# Patient Record
Sex: MALE | Race: BLACK OR AFRICAN AMERICAN | NOT HISPANIC OR LATINO | URBAN - METROPOLITAN AREA
[De-identification: names, ages, dates, MRNs, and addresses within clinical notes are randomized per-mention and may not be internally consistent; named-entity substitution may affect disease eponyms.]

---

## 2017-06-08 ENCOUNTER — OUTPATIENT (OUTPATIENT)
Dept: OUTPATIENT SERVICES | Facility: HOSPITAL | Age: 42
LOS: 1 days | End: 2017-06-08

## 2017-06-09 ENCOUNTER — OUTPATIENT (OUTPATIENT)
Dept: OUTPATIENT SERVICES | Facility: HOSPITAL | Age: 42
LOS: 1 days | End: 2017-06-09

## 2019-08-29 ENCOUNTER — NON-APPOINTMENT (OUTPATIENT)
Age: 44
End: 2019-08-29

## 2019-08-29 ENCOUNTER — APPOINTMENT (OUTPATIENT)
Dept: CARDIOLOGY | Facility: CLINIC | Age: 44
End: 2019-08-29
Payer: MEDICARE

## 2019-08-29 VITALS
SYSTOLIC BLOOD PRESSURE: 118 MMHG | DIASTOLIC BLOOD PRESSURE: 90 MMHG | WEIGHT: 290 LBS | HEART RATE: 85 BPM | HEIGHT: 78 IN | OXYGEN SATURATION: 99 % | BODY MASS INDEX: 33.55 KG/M2

## 2019-08-29 DIAGNOSIS — Z83.3 FAMILY HISTORY OF DIABETES MELLITUS: ICD-10-CM

## 2019-08-29 DIAGNOSIS — Z82.49 FAMILY HISTORY OF ISCHEMIC HEART DISEASE AND OTHER DISEASES OF THE CIRCULATORY SYSTEM: ICD-10-CM

## 2019-08-29 DIAGNOSIS — K21.9 GASTRO-ESOPHAGEAL REFLUX DISEASE W/OUT ESOPHAGITIS: ICD-10-CM

## 2019-08-29 DIAGNOSIS — K57.90 DIVERTICULOSIS OF INTESTINE, PART UNSPECIFIED, W/OUT PERFORATION OR ABSCESS W/OUT BLEEDING: ICD-10-CM

## 2019-08-29 DIAGNOSIS — Z78.9 OTHER SPECIFIED HEALTH STATUS: ICD-10-CM

## 2019-08-29 PROCEDURE — 99205 OFFICE O/P NEW HI 60 MIN: CPT

## 2019-08-29 PROCEDURE — 93000 ELECTROCARDIOGRAM COMPLETE: CPT

## 2019-08-29 NOTE — PHYSICAL EXAM
[Normal Appearance] : normal appearance [General Appearance - Well Developed] : well developed [Well Groomed] : well groomed [General Appearance - Well Nourished] : well nourished [No Deformities] : no deformities [Normal Conjunctiva] : the conjunctiva exhibited no abnormalities [General Appearance - In No Acute Distress] : no acute distress [Normal Oral Mucosa] : normal oral mucosa [Eyelids - No Xanthelasma] : the eyelids demonstrated no xanthelasmas [No Oral Pallor] : no oral pallor [No Oral Cyanosis] : no oral cyanosis [Normal Jugular Venous A Waves Present] : normal jugular venous A waves present [No Jugular Venous Hillman A Waves] : no jugular venous hillman A waves [Normal Jugular Venous V Waves Present] : normal jugular venous V waves present [Heart Sounds] : normal S1 and S2 [Heart Rate And Rhythm] : heart rate and rhythm were normal [Murmurs] : no murmurs present [Respiration, Rhythm And Depth] : normal respiratory rhythm and effort [Exaggerated Use Of Accessory Muscles For Inspiration] : no accessory muscle use [Auscultation Breath Sounds / Voice Sounds] : lungs were clear to auscultation bilaterally [Abdomen Soft] : soft [Abdomen Tenderness] : non-tender [Abdomen Mass (___ Cm)] : no abdominal mass palpated [Gait - Sufficient For Exercise Testing] : the gait was sufficient for exercise testing [Abnormal Walk] : normal gait [Nail Clubbing] : no clubbing of the fingernails [Petechial Hemorrhages (___cm)] : no petechial hemorrhages [Cyanosis, Localized] : no localized cyanosis [Skin Color & Pigmentation] : normal skin color and pigmentation [] : no rash [No Venous Stasis] : no venous stasis [Skin Lesions] : no skin lesions [No Skin Ulcers] : no skin ulcer [No Xanthoma] : no  xanthoma was observed [Oriented To Time, Place, And Person] : oriented to person, place, and time [Affect] : the affect was normal [Mood] : the mood was normal [No Anxiety] : not feeling anxious

## 2019-08-29 NOTE — REASON FOR VISIT
[Consultation] : a consultation regarding [Palpitations] : palpitations [FreeTextEntry1] : I saw this 43-year-old man in cardiac consultation on  08/29/19\par He has been on insulin diabetic for many years, had a bowel resection for diverticulitis, had a pulmonary embolus 5 years ago.\par He suffers from severe migraine headaches.\par  recently he was having a lot of extra beats when he was consuming large amounts of caffeine\par

## 2019-08-29 NOTE — HISTORY OF PRESENT ILLNESS
[FreeTextEntry1] : he has no chest pain\par He has no shortness of breath\par He has  palpitations\par He has no syncope\par He is neurologically intact\par He has no edema\par He has no GI symptoms\par

## 2019-08-29 NOTE — DISCUSSION/SUMMARY
[FreeTextEntry1] : 1) Echo with bubble study to exclude a PFO associated with migraine headaches\par 2) Exercise stress test to assess for coronary artery disease ( DM, family history)

## 2019-09-11 ENCOUNTER — OUTPATIENT (OUTPATIENT)
Dept: OUTPATIENT SERVICES | Facility: HOSPITAL | Age: 44
LOS: 1 days | End: 2019-09-11

## 2019-09-11 ENCOUNTER — APPOINTMENT (OUTPATIENT)
Dept: CARDIOLOGY | Facility: CLINIC | Age: 44
End: 2019-09-11
Payer: MEDICARE

## 2019-09-11 ENCOUNTER — TRANSCRIPTION ENCOUNTER (OUTPATIENT)
Age: 44
End: 2019-09-11

## 2019-09-11 PROCEDURE — 96374 THER/PROPH/DIAG INJ IV PUSH: CPT | Mod: 59

## 2019-09-11 PROCEDURE — 93308 TTE F-UP OR LMTD: CPT

## 2019-09-26 ENCOUNTER — APPOINTMENT (OUTPATIENT)
Dept: CARDIOLOGY | Facility: CLINIC | Age: 44
End: 2019-09-26

## 2019-10-21 ENCOUNTER — APPOINTMENT (OUTPATIENT)
Dept: CARDIOLOGY | Facility: CLINIC | Age: 44
End: 2019-10-21
Payer: MEDICARE

## 2019-10-21 PROCEDURE — 93015 CV STRESS TEST SUPVJ I&R: CPT

## 2019-10-31 ENCOUNTER — APPOINTMENT (OUTPATIENT)
Dept: CARDIOLOGY | Facility: CLINIC | Age: 44
End: 2019-10-31
Payer: MEDICARE

## 2019-10-31 VITALS
SYSTOLIC BLOOD PRESSURE: 118 MMHG | HEIGHT: 78 IN | HEART RATE: 95 BPM | RESPIRATION RATE: 18 BRPM | DIASTOLIC BLOOD PRESSURE: 82 MMHG | BODY MASS INDEX: 33.21 KG/M2 | WEIGHT: 287 LBS | OXYGEN SATURATION: 95 %

## 2019-10-31 DIAGNOSIS — Z00.00 ENCOUNTER FOR GENERAL ADULT MEDICAL EXAMINATION W/OUT ABNORMAL FINDINGS: ICD-10-CM

## 2019-10-31 DIAGNOSIS — R94.31 ABNORMAL ELECTROCARDIOGRAM [ECG] [EKG]: ICD-10-CM

## 2019-10-31 PROCEDURE — 99215 OFFICE O/P EST HI 40 MIN: CPT

## 2019-10-31 RX ORDER — METFORMIN HYDROCHLORIDE 500 MG/1
500 TABLET, COATED ORAL TWICE DAILY
Refills: 0 | Status: ACTIVE | COMMUNITY
Start: 2019-08-29

## 2019-10-31 RX ORDER — PROPRANOLOL HYDROCHLORIDE 60 MG/1
60 TABLET ORAL DAILY
Refills: 0 | Status: ACTIVE | COMMUNITY
Start: 2019-08-29

## 2019-10-31 RX ORDER — SEMAGLUTIDE 1.34 MG/ML
2 INJECTION, SOLUTION SUBCUTANEOUS WEEKLY
Refills: 0 | Status: ACTIVE | COMMUNITY
Start: 2019-08-29

## 2019-10-31 RX ORDER — DULOXETINE HYDROCHLORIDE 60 MG/1
60 CAPSULE, DELAYED RELEASE ORAL DAILY
Refills: 0 | Status: ACTIVE | COMMUNITY
Start: 2019-08-29

## 2019-10-31 RX ORDER — OMEPRAZOLE 40 MG/1
40 CAPSULE, DELAYED RELEASE ORAL DAILY
Refills: 0 | Status: ACTIVE | COMMUNITY
Start: 2019-08-29

## 2019-10-31 NOTE — PHYSICAL EXAM
[General Appearance - Well Developed] : well developed [Normal Appearance] : normal appearance [Well Groomed] : well groomed [General Appearance - Well Nourished] : well nourished [No Deformities] : no deformities [General Appearance - In No Acute Distress] : no acute distress [Normal Conjunctiva] : the conjunctiva exhibited no abnormalities [Eyelids - No Xanthelasma] : the eyelids demonstrated no xanthelasmas [Normal Oral Mucosa] : normal oral mucosa [No Oral Pallor] : no oral pallor [No Oral Cyanosis] : no oral cyanosis [Normal Jugular Venous A Waves Present] : normal jugular venous A waves present [Normal Jugular Venous V Waves Present] : normal jugular venous V waves present [No Jugular Venous Hillman A Waves] : no jugular venous hillman A waves [Respiration, Rhythm And Depth] : normal respiratory rhythm and effort [Exaggerated Use Of Accessory Muscles For Inspiration] : no accessory muscle use [Auscultation Breath Sounds / Voice Sounds] : lungs were clear to auscultation bilaterally [Heart Rate And Rhythm] : heart rate and rhythm were normal [Heart Sounds] : normal S1 and S2 [Murmurs] : no murmurs present [Abdomen Soft] : soft [Abdomen Tenderness] : non-tender [Abdomen Mass (___ Cm)] : no abdominal mass palpated [Abnormal Walk] : normal gait [Gait - Sufficient For Exercise Testing] : the gait was sufficient for exercise testing [Nail Clubbing] : no clubbing of the fingernails [Cyanosis, Localized] : no localized cyanosis [Petechial Hemorrhages (___cm)] : no petechial hemorrhages [Skin Color & Pigmentation] : normal skin color and pigmentation [] : no rash [No Venous Stasis] : no venous stasis [Skin Lesions] : no skin lesions [No Skin Ulcers] : no skin ulcer [No Xanthoma] : no  xanthoma was observed [Oriented To Time, Place, And Person] : oriented to person, place, and time [Affect] : the affect was normal [Mood] : the mood was normal [No Anxiety] : not feeling anxious

## 2019-10-31 NOTE — REASON FOR VISIT
[Follow-Up - Clinic] : a clinic follow-up of [Palpitations] : palpitations [FreeTextEntry1] : I saw this 44-year-old man in f/u cardiac consultation on  10/31/19\par He has been on insulin diabetic for many years, had a bowel resection for diverticulitis, had a pulmonary embolus 5 years ago.\par He suffers from severe migraine headaches.\par  recently he was having a lot of extra beats when he was consuming large amounts of caffeine\par

## 2019-10-31 NOTE — DISCUSSION/SUMMARY
[FreeTextEntry1] : 1) Echo with bubble study to exclude a PFO was negative. Normal study.\par 2) Exercise stress test to assess for coronary artery disease ( DM, family history) was normal.\par Suggest f/u in one year

## 2020-09-10 ENCOUNTER — OUTPATIENT (OUTPATIENT)
Dept: OUTPATIENT SERVICES | Facility: HOSPITAL | Age: 45
LOS: 1 days | End: 2020-09-10

## 2020-10-29 ENCOUNTER — APPOINTMENT (OUTPATIENT)
Dept: CARDIOLOGY | Facility: CLINIC | Age: 45
End: 2020-10-29

## 2021-08-05 ENCOUNTER — OUTPATIENT (OUTPATIENT)
Dept: OUTPATIENT SERVICES | Facility: HOSPITAL | Age: 46
LOS: 1 days | End: 2021-08-05

## 2023-12-05 ENCOUNTER — OFFICE (OUTPATIENT)
Dept: URBAN - METROPOLITAN AREA CLINIC 103 | Facility: CLINIC | Age: 48
Setting detail: OPHTHALMOLOGY
End: 2023-12-05
Payer: MEDICARE

## 2023-12-05 DIAGNOSIS — G43.109: ICD-10-CM

## 2023-12-05 DIAGNOSIS — E11.9: ICD-10-CM

## 2023-12-05 DIAGNOSIS — H35.033: ICD-10-CM

## 2023-12-05 PROCEDURE — 92004 COMPRE OPH EXAM NEW PT 1/>: CPT | Performed by: OPHTHALMOLOGY

## 2023-12-05 ASSESSMENT — CONFRONTATIONAL VISUAL FIELD TEST (CVF)
OS_FINDINGS: FULL
OD_FINDINGS: FULL

## 2023-12-05 ASSESSMENT — REFRACTION_CURRENTRX
OD_VPRISM_DIRECTION: SV
OS_OVR_VA: 20/
OS_VPRISM_DIRECTION: SV
OS_SPHERE: -0.25
OD_CYLINDER: -1.50
OD_SPHERE: PLANO
OD_AXIS: 092
OS_AXIS: 122
OD_OVR_VA: 20/
OS_CYLINDER: -0.50

## 2023-12-05 ASSESSMENT — REFRACTION_AUTOREFRACTION
OD_SPHERE: -0.25
OD_CYLINDER: -1.25
OS_AXIS: 124
OD_AXIS: 082
OS_SPHERE: PLANO
OS_CYLINDER: -1.25

## 2023-12-05 ASSESSMENT — SPHEQUIV_DERIVED: OD_SPHEQUIV: -0.875

## 2024-03-14 ENCOUNTER — APPOINTMENT (OUTPATIENT)
Dept: NEUROLOGY | Facility: CLINIC | Age: 49
End: 2024-03-14
Payer: MEDICARE

## 2024-03-14 VITALS
DIASTOLIC BLOOD PRESSURE: 83 MMHG | WEIGHT: 260 LBS | SYSTOLIC BLOOD PRESSURE: 132 MMHG | HEIGHT: 78 IN | OXYGEN SATURATION: 98 % | HEART RATE: 79 BPM | BODY MASS INDEX: 30.08 KG/M2

## 2024-03-14 DIAGNOSIS — G50.0 TRIGEMINAL NEURALGIA: ICD-10-CM

## 2024-03-14 DIAGNOSIS — R55 SYNCOPE AND COLLAPSE: ICD-10-CM

## 2024-03-14 PROCEDURE — G2211 COMPLEX E/M VISIT ADD ON: CPT

## 2024-03-14 PROCEDURE — 99205 OFFICE O/P NEW HI 60 MIN: CPT

## 2024-03-14 RX ORDER — INSULIN GLARGINE 100 [IU]/ML
100 INJECTION, SOLUTION SUBCUTANEOUS DAILY
Refills: 0 | Status: DISCONTINUED | COMMUNITY
Start: 2019-08-29 | End: 2024-03-14

## 2024-03-14 RX ORDER — ONABOTULINUMTOXINA 200 [USP'U]/1
200 INJECTION, POWDER, LYOPHILIZED, FOR SOLUTION INTRADERMAL; INTRAMUSCULAR
Refills: 0 | Status: DISCONTINUED | COMMUNITY
Start: 2019-08-29 | End: 2024-03-14

## 2024-03-14 RX ORDER — TOPIRAMATE 25 MG/1
25 TABLET, FILM COATED ORAL TWICE DAILY
Refills: 0 | Status: DISCONTINUED | COMMUNITY
Start: 2019-08-29 | End: 2024-03-14

## 2024-03-14 RX ORDER — UBIDECARENONE/VIT E ACET 100MG-5
1000 CAPSULE ORAL
Refills: 0 | Status: DISCONTINUED | COMMUNITY
End: 2024-03-14

## 2024-03-14 RX ORDER — LISDEXAMFETAMINE DIMESYLATE 30 MG/1
30 CAPSULE ORAL
Refills: 0 | Status: ACTIVE | COMMUNITY

## 2024-03-14 NOTE — HISTORY OF PRESENT ILLNESS
[FreeTextEntry1] : CC: chronic migraine and neuralgia     Patient Referred by: ophthalmology     HPI: Has a bruise on his back that radiates to the head, arms, . If he pushes on his anterior chest he will get discomfort. Also sometimes can't press on his stomach He was vomiting because of Ozempic. Notices when he gets up and down he gets nauseated and vomits. He has had trigeminal pain across the face on the left side. He has had shooting pain on the right side shooting across the top of the head and into the eye. He had botox and thinks that it caused his new problems. He see's Dr. carrillo for nerve blocks. He was getting steroid shots in the shoulder and neck with Chiropractor and Dr. Trujillo for epidural. He often uses heat on his back. When he does this it will sometimes cause bleeding. He went to dermatology which was indeterminate. He was also seen by rheumatology at [place] and another in [facility] which was negative.  He is due for the Aimovig shot next week. He can feel when it is wearing off. The facial pain he describes as "spidering"' across the face, usually staring at the jaw, the ear, tip of the nose or across the eye socket. Exacerbated by turning the head, cold air, brushing his teeth, wind, spend to much time in the car or on the computer. Sharp pain at times, there is a refractory period. On the right side the pain radiates from the back. The back pain radiates to the top of the arm, front of the chest, feels like a jolt, electrical, numbness. He has episodes of passing out. He thought initially was from getting up to quickly. Feels a "snapping" in the back of his head, thought it was from pain. Things will go swirly and then pass out. has about 3-4 times a week. Fell forward into the cabinet, tried to fight it and was shaking. Not clearly losing consciousness. He is able to get right back up in a few seconds later. hasn't had a workup for this yet. denies any SOB, palpitations or chest pain. His PCP wants him to see a cardiologist.      HAs began: since 2011 was first diagnosed, but had then since he was a child,  in the past 5 years he has a bruise on his back that he is unsure how it started.  Location: left sided, then through the right vertex Quality: can be like a dagger through the right side, throbbing pressure, "awareness of my brain" Severity: 8/10 , 5/10 Disability: MIDAS 130 severe Duration: constant Frequency: daily Temporal course: afternoons around 3pm Time of day: can wake up with headache Pain Free between Attacks: rarely days Triggers: scents, noise, aggravation, light,  Relieving factors: lemongrass roller, ice hats, quiet room, light blocking glasses, taking a nap at 2:45 Exacerbating factors: exercise and Valsalva maneuver do not make headache worse Prodrome or postdrome: has feeling like switch and can tell something is coming on "like a negative across the screen"   Aura: none  Ass'd Symptoms: Nausea + Vomiting + in last 1-2 yrs Photophobia + Phonophobia: + Osmophobia brain fog/difficulty concentrating + Irritability Allodynia - Blurred Vision - Neck pain + Dizziness +   Autonomic features: none   Additional Social/Work History: Occupation: on disability for the headaches, was a technician Habits: Caffeine:  coffee 1 -2 cups daily ETOH:   none     Tobacco:   none    Drugs: cannabis vape and edible,  daily- helpful for HA Exercise: trying to get some  Diet:    hydration: tries to keep hydrated Ophthalmologic: wear, regular eye checkups Sleep: descent unless in pain, can get 3-4 hrs. he does snore- does not have SISI Dental:   unknown Sinus/Allergies: none Head Trauma: fallen and hit his head 10-15 times, has had concussions Hypermobility: Psychiatric: anxiety and depression   Treatment present: Acute: Nurtec,  Preventive: aimovig 140mg monthly 4-5 years- works for the right sided pain. works for about 3 weeks, recently given Nurtec - recent but hasn't picked up yet. - works but doesn't resolve pain. cymbalta 60/30, propranolol 60/120mg   Prior medications: topiramate, , Botox for 2 years- last in 2022, propranolol, sumatriptan- allergic, amitriptyline- allergic, depakote - worked well but had allergy, indomethacin- worked very well, but then had anaphylaxis. gabapentin- allergic,    Non-pharmacologic Tx:   Imaging: MRI brain 1/19/2023: There are numerous small nonspecific nonenhancing white matter hyperintensities scattered throughout the cerebral hemispheres bilaterally most pronounced in the frontal lobes but also seen in lesser extent in the parietal lobes with otherwise no focal parenchymal signal abnormalities.  No contrast-enhancement.  There is a small cyst or polyp in the right maxillary antrum.  There is vertebrobasilar tortuosity.   MR cervical  spine 2022: At C5-C6 a left paracentral disc protrusion is slightly larger than on the prior  study. There is mild thecal sac compression and the disc protrusion abuts the left anterolateral aspect of the spinal cord.  At C6-C7 there is a circumferential disc osteophytic ridge and uncovertebral hypertrophy. There is no residual disc protrusion. There is moderate left foraminal stenosis.  Mild degenerative changes are seen at other levels. There is mild foraminal stenosis at C3-C4 and C4-C5.   Labs:     Childhood precursors: Somnambulism motion sickness cyclical vomiting     FH: none

## 2024-03-14 NOTE — PHYSICAL EXAM
[General Appearance - Alert] : alert [General Appearance - In No Acute Distress] : in no acute distress [Oriented To Time, Place, And Person] : oriented to person, place, and time [Impaired Insight] : insight and judgment were intact [Affect] : the affect was normal [Person] : oriented to person [Place] : oriented to place [Time] : oriented to time [Concentration Intact] : normal concentrating ability [Visual Intact] : visual attention was ~T not ~L decreased [Naming Objects] : no difficulty naming common objects [Repeating Phrases] : no difficulty repeating a phrase [Writing A Sentence] : no difficulty writing a sentence [Fluency] : fluency intact [Comprehension] : comprehension intact [Reading] : reading intact [Past History] : adequate knowledge of personal past history [Cranial Nerves Optic (II)] : visual acuity intact bilaterally,  visual fields full to confrontation, pupils equal round and reactive to light [Cranial Nerves Oculomotor (III)] : extraocular motion intact [Cranial Nerves Trigeminal (V)] : facial sensation intact symmetrically [Cranial Nerves Facial (VII)] : face symmetrical [Cranial Nerves Vestibulocochlear (VIII)] : hearing was intact bilaterally [Cranial Nerves Glossopharyngeal (IX)] : tongue and palate midline [Cranial Nerves Hypoglossal (XII)] : there was no tongue deviation with protrusion [Cranial Nerves Accessory (XI - Cranial And Spinal)] : head turning and shoulder shrug symmetric [Motor Strength] : muscle strength was normal in all four extremities [No Muscle Atrophy] : normal bulk in all four extremities [Sensation Tactile Decrease] : light touch was intact [Balance] : balance was intact [2+] : Ankle jerk left 2+ [Sclera] : the sclera and conjunctiva were normal [PERRL With Normal Accommodation] : pupils were equal in size, round, reactive to light, with normal accommodation [Extraocular Movements] : extraocular movements were intact [Full Visual Field] : full visual field [Outer Ear] : the ears and nose were normal in appearance [Hearing Threshold Finger Rub Not Lea] : hearing was normal [Neck Appearance] : the appearance of the neck was normal [Respiration, Rhythm And Depth] : normal respiratory rhythm and effort [Abnormal Walk] : normal gait [Musculoskeletal - Swelling] : no joint swelling seen [Nail Clubbing] : no clubbing  or cyanosis of the fingernails [Motor Tone] : muscle strength and tone were normal [Skin Color & Pigmentation] : normal skin color and pigmentation [Skin Turgor] : normal skin turgor [] : no rash [FreeTextEntry1] : Left temporal region tenderness.  Mildly positive Tinel's over the occipital nerves.  Bilateral trapezius muscle spasm without significant tenderness. [Over the Past 2 Weeks, Have You Felt Down, Depressed, or Hopeless?] : 1.) Over the past 2 weeks, have you felt down, depressed, or hopeless? No [Over the Past 2 Weeks, Have You Felt Little Interest or Pleasure Doing Things?] : 2.) Over the past 2 weeks, have you felt little interest or pleasure doing things? No [Motor Handedness Right-Handed] : the patient is right hand dominant [Tremor] : no tremor present [Past-pointing] : there was no past-pointing [Plantar Reflex Right Only] : normal on the right [Plantar Reflex Left Only] : normal on the left

## 2024-03-14 NOTE — ASSESSMENT
[FreeTextEntry1] : 48-year-old man here with multiple complaints.  He has longstanding chronic migraine currently not well-controlled still having greater than 20 days of headache although somewhat better with Aimovig which helps with the stabbing pain on the right side but does not help with the pain on the left side.  He has had positive effect with other preventatives in the past but developed allergic reactions which limits therapeutic options.  He also has left-sided facial pain that is stabbing triggered by wind, cold, brushing his teeth which seems consistent with trigeminal neuralgia.  There is pain on the right side which stems from the occipital region radiating forward into the eye which seems consistent with occipital neuralgia of the right side.  He also complains of left-sided weakness and pain down the arm which may be related to known cervical disease at C5-C6 and C6-C7 due to foraminal stenosis.  Back pain on the left side radiating to the anterior chest could be consistent with a T2 level lesion.  Will assess further with imaging.  His episodes of sudden falls are consistent with syncope and thus he needs syncope workup with cardiology, but will also rule out posterior vascular insufficiency.  Workup   Imaging: MRI angio brain w/without contrast to evaluate for vascular loop compressing the trigeminal nerve as well as any posterior circulation abnormality to explain his frequent syncope. Tortuous vertebrobasilar seen on MRI brain.     Acute: Has GERD and NSAIDS irritate his stomach. He has anaphylactic reaction to triptans.  Thus lasmiditan would be a good option for him to try. Start 50mg as needed for onset of migraine, can increase to 100mg if no effect.    Prevention: Agree with adding Nurtec QOD. script sent by his pain specialist. Continue Aimovig 140mg monthly, continue propranolol, and Cymbalta.  He will let me know if he is unable to get the Nurtec   Advised to keep headache journal to track headache frequency, triggers, and response to treatment.   Discussed common side effects of prescribed medications and potential alternatives.    Counseled patient on the importance of maintaining a healthy lifestyle, including balanced diet, adequate hydration, stress management, proper sleep hygiene, and physical activity.   Follow-up in 3 months.    Answered all questions and concerns to the best of my ability. Advised to call for any new or worsening symptoms.    In order to maintain continuity of care/prescription refills, patients must be seen on a yearly basis.   Total time spent on the day of the visit, including pre-visit and post-visit time was 65 minutes.

## 2024-04-17 ENCOUNTER — APPOINTMENT (OUTPATIENT)
Dept: CARDIOLOGY | Facility: CLINIC | Age: 49
End: 2024-04-17

## 2024-04-25 ENCOUNTER — NON-APPOINTMENT (OUTPATIENT)
Age: 49
End: 2024-04-25

## 2024-04-25 ENCOUNTER — APPOINTMENT (OUTPATIENT)
Dept: CARDIOLOGY | Facility: CLINIC | Age: 49
End: 2024-04-25
Payer: MEDICARE

## 2024-04-25 VITALS
SYSTOLIC BLOOD PRESSURE: 140 MMHG | WEIGHT: 264 LBS | HEIGHT: 78 IN | HEART RATE: 80 BPM | DIASTOLIC BLOOD PRESSURE: 90 MMHG | BODY MASS INDEX: 30.55 KG/M2 | OXYGEN SATURATION: 98 %

## 2024-04-25 DIAGNOSIS — E08.42 DIABETES MELLITUS DUE TO UNDERLYING CONDITION WITH DIABETIC POLYNEUROPATHY: ICD-10-CM

## 2024-04-25 DIAGNOSIS — E11.9 TYPE 2 DIABETES MELLITUS W/OUT COMPLICATIONS: ICD-10-CM

## 2024-04-25 DIAGNOSIS — R00.2 PALPITATIONS: ICD-10-CM

## 2024-04-25 DIAGNOSIS — M54.6 PAIN IN THORACIC SPINE: ICD-10-CM

## 2024-04-25 PROCEDURE — 99214 OFFICE O/P EST MOD 30 MIN: CPT

## 2024-04-25 PROCEDURE — 93000 ELECTROCARDIOGRAM COMPLETE: CPT

## 2024-04-25 RX ORDER — DULOXETINE HYDROCHLORIDE 30 MG/1
30 CAPSULE, DELAYED RELEASE ORAL DAILY
Refills: 0 | Status: ACTIVE | COMMUNITY

## 2024-04-25 NOTE — DISCUSSION/SUMMARY
[FreeTextEntry1] : 1) Echo with bubble study to exclude a PFO was negative. Normal study. 2) Exercise stress test to assess for coronary artery disease ( DM, family history) was normal. However because of his long-term diabetes and new symptoms I recommended strongly a cardiac CTA.  He will try and get this scheduled. [EKG obtained to assist in diagnosis and management of assessed problem(s)] : EKG obtained to assist in diagnosis and management of assessed problem(s)

## 2024-04-25 NOTE — PHYSICAL EXAM
[General Appearance - Well Developed] : well developed [Normal Appearance] : normal appearance [Well Groomed] : well groomed [General Appearance - Well Nourished] : well nourished [No Deformities] : no deformities [Normal Conjunctiva] : the conjunctiva exhibited no abnormalities [General Appearance - In No Acute Distress] : no acute distress [Eyelids - No Xanthelasma] : the eyelids demonstrated no xanthelasmas [Normal Oral Mucosa] : normal oral mucosa [No Oral Pallor] : no oral pallor [No Oral Cyanosis] : no oral cyanosis [Normal Jugular Venous A Waves Present] : normal jugular venous A waves present [Normal Jugular Venous V Waves Present] : normal jugular venous V waves present [No Jugular Venous Hillman A Waves] : no jugular venous hillman A waves [Respiration, Rhythm And Depth] : normal respiratory rhythm and effort [Exaggerated Use Of Accessory Muscles For Inspiration] : no accessory muscle use [Auscultation Breath Sounds / Voice Sounds] : lungs were clear to auscultation bilaterally [Heart Rate And Rhythm] : heart rate and rhythm were normal [Heart Sounds] : normal S1 and S2 [Murmurs] : no murmurs present [Abdomen Soft] : soft [Abdomen Tenderness] : non-tender [Abdomen Mass (___ Cm)] : no abdominal mass palpated [Abnormal Walk] : normal gait [Gait - Sufficient For Exercise Testing] : the gait was sufficient for exercise testing [Nail Clubbing] : no clubbing of the fingernails [Cyanosis, Localized] : no localized cyanosis [Petechial Hemorrhages (___cm)] : no petechial hemorrhages [Skin Color & Pigmentation] : normal skin color and pigmentation [] : no rash [No Venous Stasis] : no venous stasis [Skin Lesions] : no skin lesions [No Skin Ulcers] : no skin ulcer [No Xanthoma] : no  xanthoma was observed [Oriented To Time, Place, And Person] : oriented to person, place, and time [Mood] : the mood was normal [Affect] : the affect was normal [No Anxiety] : not feeling anxious

## 2024-04-25 NOTE — HISTORY OF PRESENT ILLNESS
[FreeTextEntry1] : he has some chest pain He has no shortness of breath He has  palpitations He has no syncope He is neurologically intact He has no edema He has no GI symptoms

## 2024-04-25 NOTE — REASON FOR VISIT
[FreeTextEntry1] : I saw this 48-year-old man in f/u cardiac consultation on  04/25/24 He has been on insulin diabetic for many years, had a bowel resection for diverticulitis, had a pulmonary embolus 5 years ago. He suffers from severe migraine headaches.  recently he was having a lot of extra beats when he was consuming large amounts of caffeine He comes in to see me today because he feels that he is having some discomfort in his back and chest and is concerned about his heart.  [Follow-Up - Clinic] : a clinic follow-up of [Palpitations] : palpitations

## 2024-05-02 ENCOUNTER — APPOINTMENT (OUTPATIENT)
Dept: CARDIOLOGY | Facility: CLINIC | Age: 49
End: 2024-05-02

## 2024-06-17 ENCOUNTER — APPOINTMENT (OUTPATIENT)
Dept: NEUROLOGY | Facility: CLINIC | Age: 49
End: 2024-06-17
Payer: MEDICARE

## 2024-06-17 VITALS
HEIGHT: 78 IN | HEART RATE: 87 BPM | DIASTOLIC BLOOD PRESSURE: 90 MMHG | BODY MASS INDEX: 30.55 KG/M2 | SYSTOLIC BLOOD PRESSURE: 143 MMHG | OXYGEN SATURATION: 96 % | WEIGHT: 264 LBS

## 2024-06-17 DIAGNOSIS — F98.8 OTHER SPECIFIED BEHAVIORAL AND EMOTIONAL DISORDERS WITH ONSET USUALLY OCCURRING IN CHILDHOOD AND ADOLESCENCE: ICD-10-CM

## 2024-06-17 DIAGNOSIS — Z86.69 PERSONAL HISTORY OF OTHER DISEASES OF THE NERVOUS SYSTEM AND SENSE ORGANS: ICD-10-CM

## 2024-06-17 DIAGNOSIS — R56.9 UNSPECIFIED CONVULSIONS: ICD-10-CM

## 2024-06-17 DIAGNOSIS — Z86.39 PERSONAL HISTORY OF OTHER ENDOCRINE, NUTRITIONAL AND METABOLIC DISEASE: ICD-10-CM

## 2024-06-17 DIAGNOSIS — Z87.19 PERSONAL HISTORY OF OTHER DISEASES OF THE DIGESTIVE SYSTEM: ICD-10-CM

## 2024-06-17 DIAGNOSIS — E11.42 TYPE 2 DIABETES MELLITUS WITH DIABETIC POLYNEUROPATHY: ICD-10-CM

## 2024-06-17 DIAGNOSIS — Z78.9 OTHER SPECIFIED HEALTH STATUS: ICD-10-CM

## 2024-06-17 PROCEDURE — G2211 COMPLEX E/M VISIT ADD ON: CPT

## 2024-06-17 PROCEDURE — 99213 OFFICE O/P EST LOW 20 MIN: CPT

## 2024-06-17 NOTE — REASON FOR VISIT
[Follow-Up: _____] : a [unfilled] follow-up visit [FreeTextEntry1] : ADD in adult, possibly absence seizures

## 2024-06-17 NOTE — PHYSICAL EXAM
[General Appearance - Alert] : alert [General Appearance - In No Acute Distress] : in no acute distress [General Appearance - Well Nourished] : well nourished [General Appearance - Well Developed] : well developed [Oriented To Time, Place, And Person] : oriented to person, place, and time [Affect] : the affect was normal [Mood] : the mood was normal [Cranial Nerves Optic (II)] : visual acuity intact bilaterally,  visual fields full to confrontation, pupils equal round and reactive to light [Cranial Nerves Oculomotor (III)] : extraocular motion intact [Cranial Nerves Trigeminal (V)] : facial sensation intact symmetrically [Cranial Nerves Facial (VII)] : face symmetrical [Cranial Nerves Vestibulocochlear (VIII)] : hearing was intact bilaterally [Cranial Nerves Glossopharyngeal (IX)] : tongue and palate midline [Cranial Nerves Accessory (XI - Cranial And Spinal)] : head turning and shoulder shrug symmetric [Motor Handedness Right-Handed] : the patient is right hand dominant [Sensation Tactile Decrease] : light touch was intact [Sensation Vibration Decrease] : vibration was intact [Abnormal Walk] : normal gait [Balance] : balance was intact [2+] : Patella left 2+ [PERRL With Normal Accommodation] : pupils were equal in size, round, reactive to light, with normal accommodation [Extraocular Movements] : extraocular movements were intact [Hearing Threshold Finger Rub Not [place]] : hearing was normal [Neck Appearance] : the appearance of the neck was normal [Exaggerated Use Of Accessory Muscles For Inspiration] : no accessory muscle use [Nail Clubbing] : no clubbing  or cyanosis of the fingernails [Skin Color & Pigmentation] : normal skin color and pigmentation [Skin Turgor] : normal skin turgor [] : no rash [Skin Lesions] : no skin lesions [Over the Past 2 Weeks, Have You Felt Down, Depressed, or Hopeless?] : 1.) Over the past 2 weeks, have you felt down, depressed, or hopeless? No [Over the Past 2 Weeks, Have You Felt Little Interest or Pleasure Doing Things?] : 2.) Over the past 2 weeks, have you felt little interest or pleasure doing things? No [Paresis Pronator Drift Right-Sided] : no pronator drift on the right [Paresis Pronator Drift Left-Sided] : no pronator drift on the left [Motor Strength Upper Extremities Bilaterally] : strength was normal in both upper extremities [Motor Strength Lower Extremities Bilaterally] : strength was normal in both lower extremities [Romberg's Sign] : Romberg's sign was negtive [Tremor] : no tremor present [Abdomen Soft] : soft [Abdomen Tenderness] : non-tender

## 2024-06-17 NOTE — DISCUSSION/SUMMARY
[FreeTextEntry1] : 48-year-old male right-hand-dominant with longstanding history of ADD.  Patient mentions history of blank staring out words with some automatisms.  Patient states worse in adolescent and adulthood.  This is consistent with ADD and possible absence seizures.  My plan is as follows:  1.  Request routine EEG 61 minutes to evaluate for focal activity  2.  Continue Vyvanse 40 mg every 24 hours  3.  Return to clinic pending at this time

## 2024-06-17 NOTE — HISTORY OF PRESENT ILLNESS
[FreeTextEntry1] : 48-year-old male presents to clinic with c/o longstanding history of adult ADD and is currently being followed by Dr. Price for migraine headaches.  Patient states being seen by Dr. Lamas at [facility] for management of his ADD.  Patient has been on Vyvanse 30 mg x 3 years, was recently increased to 40 mg every 24 hours for effectiveness with complaints of difficulty concentrating, focus limitations, task completion and turn taking. Prior to Vyvanse patient was on Adderall for approximately 6 months although effective patient experienced stimulant side effects.  Patient's highest level of education is some college.  Patient also complains of repeated episodes of "blank starring" where he will experience a sudden stopping activity without falling, eye fluttering, chewing motions not sure of lip smacking, vague small movements of both hands not sure of finger rubbing and loss of memory of the incidents. Patient doesn't know how many episodes he experiences daily, and states durations can be upwards to 30 seconds. Patient states was worse when adolescent.  Patient unsure of HA and absence activity correlation.  Patient denies any EEG recordings.   ADHD/ADD Adult Self-Report Scale: (THIS IS WHILE ON VYVANSE 30MG Q24HRS) 1.  How often do you have trouble wrapping up the final details of a project once the challenging parts have been done?  Sometimes 2.  How often do you have difficulty getting things in order when you have to do a task that requires organization?  Sometimes 3 how often do you have problems remembering appointments or obligations?  Never 4.  When you have a task that requires a lot of thought, how often do you avoid or delay getting started?  Sometimes 5.  How often do you fidget or squirm with your hands or feet when you have to sit down for a long time?  Rarely 6.  How often do you feel overly active and compelled to do things, like you were driven by a motor?  Often 7.  How often do you make careless mistakes when you have to work on a boring or difficult project?  Never 8.  How often do you have difficulty keeping your attention when you are doing boring or repetitive work?  Rarely 9.  How often do you have difficulty concentrating on what people say to you even when they are speaking to you directly?  Sometimes 10.  How often do you misplace or have difficulty finding things at home or at work?  Sometimes 11.  How often are you distracted by activity or noise around you?  Very often 12.  How often do you leave your seat in meetings or other situations in which you are expected to remain seated?  Never 13.  How often do you feel restless or fidgety?  Rarely 14.  How often do you have difficulty unwinding and relaxing when you have time to yourself?  Often 15.  How often do you find yourself talking too much when you are in social situations?  Sometimes 16.  When you are in a conversation, how often do you find yourself finishing the sentences of the people you are talking to, before they can finish them themselves?  Sometimes 17.  How often do you have difficulty waiting your turn in situations when turn-taking is required?  Sometimes 18.  How often do you interrupt others when they are busy?  Never  Past medical history positive for DM type II, h/o diverticulosis, GERD, ADD, migraines, trigeminal nerve left face, bilateral lower extremity neuropathy.  Past surgical history is positive for right shoulder arthroscopy and resection of the colon.  Past social history positive for caffeine approximately 8 ounce per day, marijuana approximately 2 bowls per day via vaping, no alcohol or tobacco.  Patient is disabled and single.  No significant family history contributions.  MRA w/wo IVC 3/28/24 unremarkable  MRI brain w/wo IVC 1/19/23: There is no territorial distribution infarct or evidence of acute ischemia, (+) numerous small nonspecific non-enhancing white matter hyperintensities scattered through the cerebral hemispheres bilaterally most pronounced in the frontal lobes.  No focal parenchymal signal abnormalities.

## 2024-06-17 NOTE — DATA REVIEWED
[de-identified] : MRI brain with and without IV contrast 1/19/2023: There is no intracranial hemorrhage, extra-axial collection, mass only Shift.  Ventricles and sulci are normal for age.  There is no territorial distribution infarct or evidence of acute ischemia.  There are numerous small nonspecific nonenhancing white matter hyperintensities scattered throughout the cerebral hemispheres bilaterally and most pronounced in the frontal lobes but also seen to a lesser extent in the parietal lobes but otherwise there are no focal parenchymal signal abnormalities.  After the intravenous administration of contrast there is no physiologic contrast-enhancement.  There are no pathologic signal voids.  Normal flow-related signal void is seen within the vessels at the skull base compatible with the patency.  There is a small cyst or polyp in the right maxillary antrum.  There is vertebral basilar tortuosity.  The craniocervical junction and sella are normal in appearance.  Impression see above for scattered nonspecific nonenhancing white matter hyperintensities.  Often a cause cannot be found for such lesions but the differential includes migraine, demyelinating, postinflammatory, vasculitic, postischemic or other causes.  Several other segmental findings are noted as above but otherwise MRI normal of the brain.  MRA head with and without IV contrast 3/28/2024: Intracranial internal carotid arteries patent bilaterally without significant stenosis.  Anterior cerebral arteries patent bilaterally without significant stenosis.  Left anterior cerebral artery is dominant.  Middle cerebral arteries patent bilaterally without significant stenosis.  Intracranial vertebral arteries patent bilaterally without significant stenosis.  Basilar artery patent without significant stenosis.  Posterior cerebral arteries patent bilaterally without significant stenosis.  There are no intracranial aneurysm.  Diffusion images do not demonstrate any acute or evolving infarct.  Impression normal intracranial MRA.

## 2024-06-17 NOTE — REVIEW OF SYSTEMS
[Memory Lapses or Loss] : memory loss [Decr. Concentrating Ability] : decreased concentrating ability [Dizziness] : dizziness [Migraine Headache] : migraine headaches [Chest Pain] : chest pain [Arthralgias] : arthralgias [Joint Pain] : joint pain [Fever] : no fever [Chills] : no chills [Feeling Tired] : not feeling tired [Sleep Disturbances] : no sleep disturbances [Anxiety] : no anxiety [Depression] : no depression [Confused or Disoriented] : no confusion [Difficulty with Language] : no ~M difficulty with language [Facial Weakness] : no facial weakness [Arm Weakness] : no arm weakness [Hand Weakness] : no hand weakness [Leg Weakness] : no leg weakness [Numbness] : no numbness [Tingling] : no tingling [Fainting] : no fainting [Lightheadedness] : no lightheadedness [Vertigo] : no vertigo [Difficulty Walking] : no difficulty walking [Inability to Walk] : able to walk [Eye Pain] : no eye pain [Loss Of Hearing] : no hearing loss [Palpitations] : no palpitations [Shortness Of Breath] : no shortness of breath [Abdominal Pain] : no abdominal pain [Dysuria] : no dysuria [Skin Lesions] : no skin lesions [Skin Wound] : no skin wound [Easy Bleeding] : no tendency for easy bleeding [Easy Bruising] : no tendency for easy bruising

## 2024-06-20 ENCOUNTER — APPOINTMENT (OUTPATIENT)
Dept: NEUROLOGY | Facility: CLINIC | Age: 49
End: 2024-06-20
Payer: MEDICARE

## 2024-06-20 VITALS
HEART RATE: 88 BPM | SYSTOLIC BLOOD PRESSURE: 153 MMHG | BODY MASS INDEX: 30.55 KG/M2 | DIASTOLIC BLOOD PRESSURE: 102 MMHG | OXYGEN SATURATION: 98 % | HEIGHT: 78 IN | WEIGHT: 264 LBS

## 2024-06-20 DIAGNOSIS — G43.709 CHRONIC MIGRAINE W/OUT AURA, NOT INTRACTABLE, W/OUT STATUS MIGRAINOSUS: ICD-10-CM

## 2024-06-20 PROCEDURE — 99214 OFFICE O/P EST MOD 30 MIN: CPT

## 2024-06-20 RX ORDER — LASMIDITAN 50 MG/1
50 TABLET ORAL TWICE DAILY
Qty: 12 | Refills: 0 | Status: ACTIVE | COMMUNITY
Start: 2024-03-14 | End: 1900-01-01

## 2024-06-20 RX ORDER — RIMEGEPANT SULFATE 75 MG/75MG
75 TABLET, ORALLY DISINTEGRATING ORAL DAILY
Qty: 16 | Refills: 3 | Status: ACTIVE | COMMUNITY
Start: 2024-04-16 | End: 1900-01-01

## 2024-06-20 RX ORDER — CANDESARTAN CILEXETIL 4 MG/1
4 TABLET ORAL
Qty: 60 | Refills: 2 | Status: ACTIVE | COMMUNITY
Start: 2024-06-20 | End: 1900-01-01

## 2024-06-21 RX ORDER — ERENUMAB-AOOE 70 MG/ML
70 INJECTION SUBCUTANEOUS
Qty: 1 | Refills: 6 | Status: DISCONTINUED | COMMUNITY
Start: 2019-08-29 | End: 2024-06-21

## 2024-06-21 RX ORDER — ERENUMAB-AOOE 140 MG/ML
140 INJECTION, SOLUTION SUBCUTANEOUS
Qty: 3 | Refills: 3 | Status: ACTIVE | COMMUNITY
Start: 2024-06-21 | End: 1900-01-01

## 2024-06-21 NOTE — PHYSICAL EXAM
[General Appearance - Alert] : alert [General Appearance - In No Acute Distress] : in no acute distress [General Appearance - Well Nourished] : well nourished [General Appearance - Well Developed] : well developed [Oriented To Time, Place, And Person] : oriented to person, place, and time [Affect] : the affect was normal [Mood] : the mood was normal [Cranial Nerves Optic (II)] : visual acuity intact bilaterally,  visual fields full to confrontation, pupils equal round and reactive to light [Cranial Nerves Oculomotor (III)] : extraocular motion intact [Cranial Nerves Trigeminal (V)] : facial sensation intact symmetrically [Cranial Nerves Facial (VII)] : face symmetrical [Cranial Nerves Vestibulocochlear (VIII)] : hearing was intact bilaterally [Cranial Nerves Accessory (XI - Cranial And Spinal)] : head turning and shoulder shrug symmetric [Cranial Nerves Glossopharyngeal (IX)] : tongue and palate midline [Cranial Nerves Hypoglossal (XII)] : there was no tongue deviation with protrusion [Motor Strength] : muscle strength was normal in all four extremities [Involuntary Movements] : no involuntary movements were seen [No Muscle Atrophy] : normal bulk in all four extremities [Motor Handedness Right-Handed] : the patient is right hand dominant [Sensation Tactile Decrease] : light touch was intact [Sensation Vibration Decrease] : vibration was intact [Abnormal Walk] : normal gait [Balance] : balance was intact [2+] : Patella left 2+ [PERRL With Normal Accommodation] : pupils were equal in size, round, reactive to light, with normal accommodation [Extraocular Movements] : extraocular movements were intact [Hearing Threshold Finger Rub Not [place]] : hearing was normal [Neck Appearance] : the appearance of the neck was normal [Exaggerated Use Of Accessory Muscles For Inspiration] : no accessory muscle use [Nail Clubbing] : no clubbing  or cyanosis of the fingernails [Skin Color & Pigmentation] : normal skin color and pigmentation [] : no rash [Skin Lesions] : no skin lesions [Over the Past 2 Weeks, Have You Felt Down, Depressed, or Hopeless?] : 1.) Over the past 2 weeks, have you felt down, depressed, or hopeless? No [Over the Past 2 Weeks, Have You Felt Little Interest or Pleasure Doing Things?] : 2.) Over the past 2 weeks, have you felt little interest or pleasure doing things? No [Paresis Pronator Drift Right-Sided] : no pronator drift on the right [Paresis Pronator Drift Left-Sided] : no pronator drift on the left [Motor Strength Upper Extremities Bilaterally] : strength was normal in both upper extremities [Motor Strength Lower Extremities Bilaterally] : strength was normal in both lower extremities [Romberg's Sign] : Romberg's sign was negtive

## 2024-06-21 NOTE — HISTORY OF PRESENT ILLNESS
[FreeTextEntry1] : 48YOM is being seen in clinic today for FUP migraine HAs with neuralgia. Patient is currently on Aimovig 140mg SQ [id number] for ppx and nurtec 75mg and Reyvow 50mgs prn for acute attacks. Patient continues to experience 15-20 HA days per month with a duration of 2-12hr duration with a severity 6-10/10 located predominantly in the L temporal region with some events at the calvaria with radiation inferiorly into center of head. Patient states palliative effectiveness with medications are Reyvow in 30-60 mins and nurtec in 2-3hrs. Patient's BP has been trending upwards.  Botox has been recommended but refused at this time by patient. Also, Patient has been on Aimovig for an extended period.  Imaging:  MRA head with and without IV contrast 3/28/2024:  Impression normal intracranial MRA  no stenosis, occlusions, aneurysms of AVMs  MRI thoracic spine non-contrast 3/20/2021: Impression mild dextroscoliosis.  T2-3 small central protruding disc herniation without spinal canal stenosis.  Right facet arthropathy. Mildly narrowing the right neural foramen.  C7-8 central disc herniation effacing the ventral epidural space and resulting in mild spinal canal stenosis.  MRI of the brain with and without IV contrast 1/19/2023: no intracranial hemorrhage, extra-axial collection, mass or midline shift.  Positive numerous small nonspecific non-enhancing white matter hyperintensities scattered through the cerebral hemispheres bilaterally most pronounced in the frontal lobes but also seen lesser extent in the parietal lobes but otherwise there is no focal parenchymal signal abnormalities.  There are no pathologic signal voids.  There is a small cyst/polyp in the right maxillary antrum.  There is vertebral basilar tortuosity.  The craniocervical junction and sella are normal in appearance.

## 2024-06-21 NOTE — DISCUSSION/SUMMARY
[FreeTextEntry1] : 48YOM RTC for continued evaluation for chronic migraine HA with neuralgia. Patient was co-seen with neurologist. My plan is as follows:   1. Continue Aimovig 140mg/ml [id number] for ppx. D/w patient may consider changing to either Ajovy or Egality for elevations in pressure.  2. Continue Reyvow 50mg as directed and Nurtec 75mg SL prn for acute attacks. D/w patient may consider changing Nurtec from acute to ppx therapy.  3. Start candesartan 4mg qhs. and increase to 8mg after 2 weeks then call office back after 4 weeks to discuss effectiveness and for any side effects. Patient asked to monitor BP as directed and educated on [id number] any possible syncope related to BP drops.   4. FUP with cardiology as previously requested by PMD.  5. Routine EEG (61mins) is scheduled on 6/25/24.  6. RTC in 3 months for FUP migraine HA.

## 2024-06-21 NOTE — END OF VISIT
[FreeTextEntry3] : 49 y/o man with chronic migraine and trigeminal neuralgia. Headaches are still about 15-20 days a month. HIs blood pressure has also been steadily increasing. This could be due to the Aimovig. Would like to switch him to a different mab such as  Emgality or Ajovy, could also consider Nurtec QOD day dosing for prevention. For now will add candesartan which will help with his blood pressure, then can switch the Aimovig. Continue Reyvow and Nuretc as needed for acute treatment. Follow up in 6 weeks.

## 2024-06-21 NOTE — REVIEW OF SYSTEMS
[Memory Lapses or Loss] : memory loss [Decr. Concentrating Ability] : decreased concentrating ability [Numbness] : numbness [Tingling] : tingling [Migraine Headache] : migraine headaches [Fever] : no fever [Chills] : no chills [Feeling Tired] : not feeling tired [Sleep Disturbances] : no sleep disturbances [Anxiety] : no anxiety [Depression] : no depression [Confused or Disoriented] : no confusion [Difficulty with Language] : no ~M difficulty with language [Facial Weakness] : no facial weakness [Arm Weakness] : no arm weakness [Hand Weakness] : no hand weakness [Leg Weakness] : no leg weakness [Seizures] : no convulsions [Dizziness] : no dizziness [Fainting] : no fainting [Lightheadedness] : no lightheadedness [Vertigo] : no vertigo [Difficulty Walking] : no difficulty walking [Inability to Walk] : able to walk [Eye Pain] : no eye pain [Loss Of Hearing] : no hearing loss [Chest Pain] : no chest pain [Palpitations] : no palpitations [Shortness Of Breath] : no shortness of breath [SOB on Exertion] : no shortness of breath during exertion [Abdominal Pain] : no abdominal pain [Dysuria] : no dysuria [Arthralgias] : no arthralgias [Joint Pain] : no joint pain [Skin Lesions] : no skin lesions [Skin Wound] : no skin wound [Easy Bleeding] : no tendency for easy bleeding [Easy Bruising] : no tendency for easy bruising

## 2024-07-30 ENCOUNTER — APPOINTMENT (OUTPATIENT)
Dept: NEUROLOGY | Facility: CLINIC | Age: 49
End: 2024-07-30
Payer: MEDICARE

## 2024-07-30 ENCOUNTER — APPOINTMENT (OUTPATIENT)
Dept: NEUROLOGY | Facility: CLINIC | Age: 49
End: 2024-07-30

## 2024-07-30 VITALS
HEART RATE: 88 BPM | WEIGHT: 249 LBS | HEIGHT: 78 IN | OXYGEN SATURATION: 97 % | SYSTOLIC BLOOD PRESSURE: 126 MMHG | BODY MASS INDEX: 28.81 KG/M2 | DIASTOLIC BLOOD PRESSURE: 86 MMHG

## 2024-07-30 DIAGNOSIS — G43.709 CHRONIC MIGRAINE W/OUT AURA, NOT INTRACTABLE, W/OUT STATUS MIGRAINOSUS: ICD-10-CM

## 2024-07-30 DIAGNOSIS — G50.0 TRIGEMINAL NEURALGIA: ICD-10-CM

## 2024-07-30 DIAGNOSIS — R56.9 UNSPECIFIED CONVULSIONS: ICD-10-CM

## 2024-07-30 PROCEDURE — 99215 OFFICE O/P EST HI 40 MIN: CPT

## 2024-07-31 NOTE — HISTORY OF PRESENT ILLNESS
[FreeTextEntry1] : Breezy Marquis is a 48 year old male with medical history significant for T2DM, GERD, ADD, trigeminal neuralgia, chronic migraine, presenting today to review EEG results.   Since last visit, he has been doing okay, he still remains with staring episodes that last for couple minutes he had routine EEG on 6/25/2024 which was normal.  His headaches are still the same frequency, 5 to 6 days out of the week they are less intense and they respond well to his abortive medication.  Very severe headache which was resolved with Reyvow, however it gave him significant brain fog, so he only takes this when headaches are severe.  He takes Nurtec every other day which is helpful.  He also Aimovig injections monthly.  He picked up the candesartan that was previously prescribed by SS, however he has not yet started this.  He is still not willing to do Botox because he had this in the past at [facility], and says he had trigeminal neuralgia after.  _______________  MRA head with and without IV contrast 3/28/2024: Impression normal intracranial MRAno stenosis, occlusions, aneurysms of AVMs MRI thoracic spine non-contrast 3/20/2021: Impression mild dextroscoliosis. T2-3 small central protruding disc herniation without spinal canal stenosis. Right facet arthropathy. Mildly narrowing the right neural foramen. C7-8 central disc herniation effacing the ventral epidural space and resulting in mild spinal canal stenosis. MRI of the brain with and without IV contrast 1/19/2023: no intracranial hemorrhage, extra-axial collection, mass or midline shift. Positive numerous small nonspecific non-enhancing white matter hyperintensities scattered through the cerebral hemispheres bilaterally most pronounced in the frontal lobes but also seen lesser extent in the parietal lobes but otherwise there is no focal parenchymal signal abnormalities. There are no pathologic signal voids. There is a small cyst/polyp in the right maxillary antrum. There is vertebral basilar tortuosity. The craniocervical junction and sella are normal in appearance.  Per f/u 6/17/24: "Patient states being seen by Dr. Lamas at [facility] for management of his ADD. Patient has been on Vyvanse 30 mg x 3 years, was recently increased to 40 mg every 24 hours for effectiveness with complaints of difficulty concentrating, focus limitations, task completion and turn taking. Prior to Vyvanse patient was on Adderall for approximately 6 months although effective patient experienced stimulant side effects. Patient's highest level of education is some college. Patient also complains of repeated episodes of "blank starring" where he will experience a sudden stopping activity without falling, eye fluttering, chewing motions not sure of lip smacking, vague small movements of both hands not sure of finger rubbing and loss of memory of the incidents. Patient doesn't know how many episodes he experiences daily, and states durations can be upwards to 30 seconds. Patient states was worse when adolescent. Patient unsure of HA and absence activity correlation. Patient denies any EEG recordings."  Per initial HPI 3/14/24: "He has longstanding chronic migraine currently not well-controlled still having greater than 20 days of headache although somewhat better with Aimovig which helps with the stabbing pain on the right side but does not help with the pain on the left side. He has had positive effect with other preventatives in the past but developed allergic reactions which limits therapeutic options. He also has left-sided facial pain that is stabbing triggered by wind, cold, brushing his teeth which seems consistent with trigeminal neuralgia. There is pain on the right side which stems from the occipital region radiating forward into the eye which seems consistent with occipital neuralgia of the right side. He also complains of left-sided weakness and pain down the arm which may be related to known cervical disease at C5-C6 and C6-C7 due to foraminal stenosis. Back pain on the left side radiating to the anterior chest could be consistent with a T2 level lesion. Will assess further with imaging. His episodes of sudden falls are consistent with syncope and thus he needs syncope workup with cardiology, but will also rule out posterior vascular insufficiency. Imaging: MRI angio brain w/without contrast to evaluate for vascular loop compressing the trigeminal nerve as well as any posterior circulation abnormality to explain his frequent syncope. Tortuous vertebrobasilar seen on MRI brain. Acute: Has GERD and NSAIDS irritate his stomach. He has anaphylactic reaction to triptans. Thus lasmiditan would be a good option for him to try. Start 50mg as needed for onset of migraine, can increase to 100mg if no effect. Prevention: Agree with adding Nurtec QOD. script sent by his pain specialist. Continue Aimovig 140mg monthly, continue propranolol, and Cymbalta. He will let me know if he is unable to get the Nurtec"

## 2024-07-31 NOTE — PHYSICAL EXAM
[FreeTextEntry1] : NEURO: Mental Status Oriented to person, place, time, and situation Speech is clear, fluent, and spontaneous. Comprehension and memory intact.   Cranial Nerves Visual fields full to confrontation Pupils equal, round, reactive to light. Extraocular movements intact. No nystagmus or ptosis. Facial sensation intact and symmetric in V1, V2, V3 Facial movement intact and symmetric Uvula midline, soft palate elevates normally Bilateral shoulder shrug intact Tongue midline, no tongue bite sign or deviation on protrusion   Motor Tone and bulk intact Shoulder abduction: 5/5 b/l Elbow flexion/extension: 5/5 bl Hand : 5/5 b/l Hip flexion/extension: 5/5 b/l Knee flexion/extension: 5/5 b/l Dorsiflexion/plantar flexion: 5/5 b/l No pronator drift   Sensation Light touch grossly intact   Deep Tendon Reflexes Biceps 2+ b/l Brachioradialis 2+ b/l Patellar 2+ b/l Ankle 2+ b/l   Coordination Normal finger to nose bilaterally No past pointing Normal heel to shin bilaterally Able to perform rapid, alternating movements No dysdiachokinesia No tremor appreciated.   Gait Normal stance, stride, and pivot turn Negative Romberg.     GEN: awake, alert, interactive, no acute distress EYES: sclera white, conjunctiva clear, no redness or discharge ENT: normal appearing outer ears, hearing grossly intact PULM: normal respiratory rhythm and effort, speaking in full sentences without distress, no accessory muscle usage EXT: moving all extremities spontaneously, no edema, no cyanosis SKIN: warm, dry, no lesions on visualized skin

## 2024-07-31 NOTE — HISTORY OF PRESENT ILLNESS
[FreeTextEntry1] : Breezy Marquis is a 48 year old male with medical history significant for T2DM, GERD, ADD, trigeminal neuralgia, chronic migraine, presenting today to review EEG results.   Since last visit, he has been doing okay, he still remains with staring episodes that last for couple minutes he had routine EEG on 6/25/2024 which was normal.  His headaches are still the same frequency, 5 to 6 days out of the week they are less intense and they respond well to his abortive medication.  Very severe headache which was resolved with Reyvow, however it gave him significant brain fog, so he only takes this when headaches are severe.  He takes Nurtec every other day which is helpful.  He also Aimovig injections monthly.  He picked up the candesartan that was previously prescribed by SS, however he has not yet started this.  He is still not willing to do Botox because he had this in the past at [facility], and says he had trigeminal neuralgia after.  _______________  MRA head with and without IV contrast 3/28/2024: Impression normal intracranial MRAno stenosis, occlusions, aneurysms of AVMs MRI thoracic spine non-contrast 3/20/2021: Impression mild dextroscoliosis. T2-3 small central protruding disc herniation without spinal canal stenosis. Right facet arthropathy. Mildly narrowing the right neural foramen. C7-8 central disc herniation effacing the ventral epidural space and resulting in mild spinal canal stenosis. MRI of the brain with and without IV contrast 1/19/2023: no intracranial hemorrhage, extra-axial collection, mass or midline shift. Positive numerous small nonspecific non-enhancing white matter hyperintensities scattered through the cerebral hemispheres bilaterally most pronounced in the frontal lobes but also seen lesser extent in the parietal lobes but otherwise there is no focal parenchymal signal abnormalities. There are no pathologic signal voids. There is a small cyst/polyp in the right maxillary antrum. There is vertebral basilar tortuosity. The craniocervical junction and sella are normal in appearance.  Per f/u 6/17/24: "Patient states being seen by Dr. Lamas at [facility] for management of his ADD. Patient has been on Vyvanse 30 mg x 3 years, was recently increased to 40 mg every 24 hours for effectiveness with complaints of difficulty concentrating, focus limitations, task completion and turn taking. Prior to Vyvanse patient was on Adderall for approximately 6 months although effective patient experienced stimulant side effects. Patient's highest level of education is some college. Patient also complains of repeated episodes of "blank starring" where he will experience a sudden stopping activity without falling, eye fluttering, chewing motions not sure of lip smacking, vague small movements of both hands not sure of finger rubbing and loss of memory of the incidents. Patient doesn't know how many episodes he experiences daily, and states durations can be upwards to 30 seconds. Patient states was worse when adolescent. Patient unsure of HA and absence activity correlation. Patient denies any EEG recordings."  Per initial HPI 3/14/24: "He has longstanding chronic migraine currently not well-controlled still having greater than 20 days of headache although somewhat better with Aimovig which helps with the stabbing pain on the right side but does not help with the pain on the left side. He has had positive effect with other preventatives in the past but developed allergic reactions which limits therapeutic options. He also has left-sided facial pain that is stabbing triggered by wind, cold, brushing his teeth which seems consistent with trigeminal neuralgia. There is pain on the right side which stems from the occipital region radiating forward into the eye which seems consistent with occipital neuralgia of the right side. He also complains of left-sided weakness and pain down the arm which may be related to known cervical disease at C5-C6 and C6-C7 due to foraminal stenosis. Back pain on the left side radiating to the anterior chest could be consistent with a T2 level lesion. Will assess further with imaging. His episodes of sudden falls are consistent with syncope and thus he needs syncope workup with cardiology, but will also rule out posterior vascular insufficiency. Imaging: MRI angio brain w/without contrast to evaluate for vascular loop compressing the trigeminal nerve as well as any posterior circulation abnormality to explain his frequent syncope. Tortuous vertebrobasilar seen on MRI brain. Acute: Has GERD and NSAIDS irritate his stomach. He has anaphylactic reaction to triptans. Thus lasmiditan would be a good option for him to try. Start 50mg as needed for onset of migraine, can increase to 100mg if no effect. Prevention: Agree with adding Nurtec QOD. script sent by his pain specialist. Continue Aimovig 140mg monthly, continue propranolol, and Cymbalta. He will let me know if he is unable to get the Nurtec"

## 2024-07-31 NOTE — ASSESSMENT
[FreeTextEntry1] : 48 year old male with medical history significant for T2DM, GERD, ADD, trigeminal neuralgia, chronic migraine, presenting today to review EEG results. Routine EEG 6/25/24 was normal. He remains with brief blank staring episodes, can be multiple times of a day.  Physical exam without focal neuro deficit.  I strongly advised him to go for prolonged ambulatory EEG up to 72 hours to further evaluate for any epileptiform or seizure-like activity.  This may also be secondary to his ADHD.  I advised him to follow-up with his previous neurologist, Dr. Lamas, or other provider who treats for ADD to ensure stability and optimize current medical management.   In terms of his chronic migraines and trigeminal neuralgia, I encouraged him to start candesartan for preventative.  He is currently on propranolol 60 mg 3 times daily, patient says this was much higher in the past, and every time they try to wean him off his blood pressure would spike.  I explained that candesartan can also help with his high blood pressure.  He is to continue Aimovig, Nurtec, and Reyvow as previously prescribed.  Patient to return to office after testing, or sooner if needed. He is aware of upcoming appointment with Dr. Price in 9/2024. Patient understands to seek urgent medical evaluation for any new or worsening symptoms.

## 2024-08-01 ENCOUNTER — APPOINTMENT (OUTPATIENT)
Dept: NEUROLOGY | Facility: CLINIC | Age: 49
End: 2024-08-01

## 2024-09-19 ENCOUNTER — APPOINTMENT (OUTPATIENT)
Dept: NEUROLOGY | Facility: CLINIC | Age: 49
End: 2024-09-19

## 2024-09-19 VITALS
BODY MASS INDEX: 28.35 KG/M2 | RESPIRATION RATE: 16 BRPM | HEIGHT: 78 IN | OXYGEN SATURATION: 99 % | HEART RATE: 91 BPM | WEIGHT: 245 LBS | DIASTOLIC BLOOD PRESSURE: 92 MMHG | SYSTOLIC BLOOD PRESSURE: 130 MMHG

## 2024-10-03 ENCOUNTER — APPOINTMENT (OUTPATIENT)
Dept: NEUROLOGY | Facility: CLINIC | Age: 49
End: 2024-10-03
Payer: MEDICARE

## 2024-10-03 PROCEDURE — 95816 EEG AWAKE AND DROWSY: CPT

## 2024-10-03 PROCEDURE — 95700 EEG CONT REC W/VID EEG TECH: CPT

## 2024-10-03 PROCEDURE — 95717 EEG PHYS/QHP 2-12 HR W/O VID: CPT

## 2024-10-03 PROCEDURE — 95708 EEG WO VID EA 12-26HR UNMNTR: CPT

## 2024-10-15 ENCOUNTER — APPOINTMENT (OUTPATIENT)
Dept: NEUROLOGY | Facility: CLINIC | Age: 49
End: 2024-10-15
Payer: MEDICARE

## 2024-10-15 VITALS
DIASTOLIC BLOOD PRESSURE: 96 MMHG | HEART RATE: 101 BPM | BODY MASS INDEX: 28.35 KG/M2 | SYSTOLIC BLOOD PRESSURE: 156 MMHG | WEIGHT: 245 LBS | OXYGEN SATURATION: 98 % | HEIGHT: 78 IN

## 2024-10-15 DIAGNOSIS — G50.0 TRIGEMINAL NEURALGIA: ICD-10-CM

## 2024-10-15 DIAGNOSIS — G43.709 CHRONIC MIGRAINE W/OUT AURA, NOT INTRACTABLE, W/OUT STATUS MIGRAINOSUS: ICD-10-CM

## 2024-10-15 PROCEDURE — 99213 OFFICE O/P EST LOW 20 MIN: CPT

## 2024-10-15 RX ORDER — EMPAGLIFLOZIN 10 MG/1
10 TABLET, FILM COATED ORAL
Refills: 0 | Status: ACTIVE | COMMUNITY

## 2024-10-15 RX ORDER — ROSUVASTATIN CALCIUM 10 MG/1
10 TABLET, FILM COATED ORAL
Refills: 0 | Status: ACTIVE | COMMUNITY

## 2025-01-15 ENCOUNTER — APPOINTMENT (OUTPATIENT)
Dept: NEUROLOGY | Facility: CLINIC | Age: 50
End: 2025-01-15

## 2025-04-01 ENCOUNTER — RX RENEWAL (OUTPATIENT)
Age: 50
End: 2025-04-01